# Patient Record
Sex: FEMALE | Race: WHITE | NOT HISPANIC OR LATINO
[De-identification: names, ages, dates, MRNs, and addresses within clinical notes are randomized per-mention and may not be internally consistent; named-entity substitution may affect disease eponyms.]

---

## 2021-07-13 ENCOUNTER — APPOINTMENT (OUTPATIENT)
Dept: PEDIATRIC ORTHOPEDIC SURGERY | Facility: CLINIC | Age: 76
End: 2021-07-13
Payer: MEDICARE

## 2021-07-13 VITALS — HEIGHT: 67 IN | BODY MASS INDEX: 28.25 KG/M2 | WEIGHT: 180 LBS

## 2021-07-13 DIAGNOSIS — Z80.9 FAMILY HISTORY OF MALIGNANT NEOPLASM, UNSPECIFIED: ICD-10-CM

## 2021-07-13 DIAGNOSIS — Z82.61 FAMILY HISTORY OF ARTHRITIS: ICD-10-CM

## 2021-07-13 DIAGNOSIS — Z83.3 FAMILY HISTORY OF DIABETES MELLITUS: ICD-10-CM

## 2021-07-13 DIAGNOSIS — Z86.79 PERSONAL HISTORY OF OTHER DISEASES OF THE CIRCULATORY SYSTEM: ICD-10-CM

## 2021-07-13 DIAGNOSIS — Z78.9 OTHER SPECIFIED HEALTH STATUS: ICD-10-CM

## 2021-07-13 DIAGNOSIS — Z87.448 PERSONAL HISTORY OF OTHER DISEASES OF URINARY SYSTEM: ICD-10-CM

## 2021-07-13 DIAGNOSIS — Z85.9 PERSONAL HISTORY OF MALIGNANT NEOPLASM, UNSPECIFIED: ICD-10-CM

## 2021-07-13 DIAGNOSIS — Z72.89 OTHER PROBLEMS RELATED TO LIFESTYLE: ICD-10-CM

## 2021-07-13 DIAGNOSIS — Z83.49 FAMILY HISTORY OF OTHER ENDOCRINE, NUTRITIONAL AND METABOLIC DISEASES: ICD-10-CM

## 2021-07-13 DIAGNOSIS — Z82.49 FAMILY HISTORY OF ISCHEMIC HEART DISEASE AND OTHER DISEASES OF THE CIRCULATORY SYSTEM: ICD-10-CM

## 2021-07-13 DIAGNOSIS — Z87.39 PERSONAL HISTORY OF OTHER DISEASES OF THE MUSCULOSKELETAL SYSTEM AND CONNECTIVE TISSUE: ICD-10-CM

## 2021-07-13 PROCEDURE — 99213 OFFICE O/P EST LOW 20 MIN: CPT | Mod: 25

## 2021-07-13 PROCEDURE — 73502 X-RAY EXAM HIP UNI 2-3 VIEWS: CPT

## 2021-07-13 PROCEDURE — 20610 DRAIN/INJ JOINT/BURSA W/O US: CPT | Mod: RT

## 2021-07-13 RX ORDER — PAROXETINE HYDROCHLORIDE 20 MG/1
20 TABLET, FILM COATED ORAL
Refills: 0 | Status: ACTIVE | COMMUNITY

## 2021-07-13 RX ORDER — PNV NO.95/FERROUS FUM/FOLIC AC 28MG-0.8MG
TABLET ORAL
Refills: 0 | Status: ACTIVE | COMMUNITY

## 2021-07-13 RX ORDER — FENOFIBRATE 48 MG/1
48 TABLET ORAL
Refills: 0 | Status: ACTIVE | COMMUNITY

## 2021-07-13 RX ORDER — CYANOCOBALAMIN (VITAMIN B-12) 1000 MCG
TABLET ORAL
Refills: 0 | Status: ACTIVE | COMMUNITY

## 2021-07-13 RX ORDER — MIRABEGRON 25 MG/1
25 TABLET, FILM COATED, EXTENDED RELEASE ORAL
Refills: 0 | Status: ACTIVE | COMMUNITY

## 2021-07-13 RX ORDER — APIXABAN 5 MG/1
5 TABLET, FILM COATED ORAL
Refills: 0 | Status: ACTIVE | COMMUNITY

## 2021-07-13 RX ORDER — EZETIMIBE 10 MG/1
10 TABLET ORAL
Refills: 0 | Status: ACTIVE | COMMUNITY

## 2021-07-13 RX ORDER — ATENOLOL 100 MG/1
100 TABLET ORAL
Refills: 0 | Status: ACTIVE | COMMUNITY

## 2021-07-13 RX ORDER — NORTRIPTYLINE HYDROCHLORIDE 75 MG/1
CAPSULE ORAL
Refills: 0 | Status: ACTIVE | COMMUNITY

## 2021-07-13 RX ORDER — ATORVASTATIN CALCIUM 40 MG/1
40 TABLET, FILM COATED ORAL
Refills: 0 | Status: ACTIVE | COMMUNITY

## 2021-07-13 RX ORDER — CARBAMAZEPINE 300 MG/1
300 CAPSULE, EXTENDED RELEASE ORAL
Refills: 0 | Status: ACTIVE | COMMUNITY

## 2021-07-13 RX ORDER — ROPINIROLE 0.25 MG/1
0.25 TABLET, FILM COATED ORAL
Refills: 0 | Status: ACTIVE | COMMUNITY

## 2021-07-13 RX ORDER — MULTIVITAMIN
TABLET ORAL
Refills: 0 | Status: ACTIVE | COMMUNITY

## 2021-07-13 NOTE — ASSESSMENT
[FreeTextEntry1] : Impression: Right trochanteric bursitis mild DJD of the right hip.\par \par I have injected the trochanteric bursa sterilely and uneventfully with 40 mg of Depo-Medrol and 7 cc of 1% lidocaine. She is not interested in physical therapy at this time she'll return as necessary.

## 2021-07-13 NOTE — PHYSICAL EXAM
[de-identified] : On exam today she has a mild antalgic gait on the right side. She has good motion to the lumbar spine in all planes with no significant spasm or tenderness present on today's visit. The right hip has mild restriction of abduction and internal rotation as compared to the opposite side. No tenderness over the anterior hip capsule she is tender about the trochanter and gluteal musculature distally. Straight leg raising is negative she is neurologically intact.\par \par X-rays taken today 2 views of the right hip reveal mild degenerative change no lesion

## 2021-07-13 NOTE — PROCEDURE
[Injection] : Injection [Right] : of the right [Greater Trochanter] : greater trochanteric bursa [Inflammation] : Inflammation [Patient] : patient [Risk] : risk [Benefits] : benefits [Alternatives] : alternatives [Infection] : infection [Allergic Reaction] : allergic reaction [Verbal Consent Obtained] : verbal consent was obtained prior to the procedure [Betadine] : Betadine [Lateral] : lateral [20] : a 20-gauge [1% Lidocaine___(mL)] : [unfilled] mL of 1% Lidocaine [Methylpred. 40mg/mL___(mL)] : [unfilled] mL 40mg/mL methylprednisolone [Bandage Applied] : a bandage [Tolerated Well] : The patient tolerated the procedure well [None] : none [No Strenuous Activity___day(s)] : avoid strenuous activity for [unfilled] day(s) [PRN] : as needed

## 2021-07-13 NOTE — HISTORY OF PRESENT ILLNESS
[de-identified] : This patient returns she is having pain in the right hip region with no injury this over the past 3-4 weeks. She does not have any significant back pain or pain does not radiate distally. She is ambulating with a mild limp on/off.

## 2022-07-18 ENCOUNTER — APPOINTMENT (OUTPATIENT)
Dept: PEDIATRIC ORTHOPEDIC SURGERY | Facility: CLINIC | Age: 77
End: 2022-07-18

## 2022-07-18 PROCEDURE — 99212 OFFICE O/P EST SF 10 MIN: CPT

## 2022-07-18 NOTE — ASSESSMENT
[FreeTextEntry1] : Impression: Mild symptomatic arthritis left hip with trochanteric bursitis.\par \par She will continue symptomatically and will return as necessary

## 2022-07-18 NOTE — HISTORY OF PRESENT ILLNESS
[de-identified] : This 76-year-old returns for follow-up of her left hip pain.  She does reasonably well there are days when she does not have much in the way of any discomfort and does not limp.  She has no significant complaints of back pain and her pain does not radiate distally.  She on today's visit is actually feeling quite good.  This to be noted she did have a recent bone density study which revealed osteopenia.  She also is being worked up for elevated parathyroid hormone.  She is scheduled to complete a 24-hour urinary collection for this.

## 2022-07-18 NOTE — PHYSICAL EXAM
[de-identified] : On exam today she is walking without any significant limp she has good motion to the lumbar spine with minimal spasm if any good motion to the left hip mildly restricted in abduction internal rotation with minimal discomfort if any over the trochanteric bursa.  The remainder the exam is benign

## 2022-08-08 ENCOUNTER — APPOINTMENT (OUTPATIENT)
Dept: PEDIATRIC ORTHOPEDIC SURGERY | Facility: CLINIC | Age: 77
End: 2022-08-08

## 2022-08-08 VITALS — WEIGHT: 201 LBS | TEMPERATURE: 96.7 F | HEIGHT: 67 IN | BODY MASS INDEX: 31.55 KG/M2

## 2022-08-08 DIAGNOSIS — M16.11 UNILATERAL PRIMARY OSTEOARTHRITIS, RIGHT HIP: ICD-10-CM

## 2022-08-08 PROCEDURE — 99213 OFFICE O/P EST LOW 20 MIN: CPT | Mod: 25

## 2022-08-08 PROCEDURE — 20610 DRAIN/INJ JOINT/BURSA W/O US: CPT | Mod: RT

## 2022-08-08 NOTE — PROCEDURE
[FreeTextEntry1] : Under sterile technique with a Betadine prep the left trochanteric bursa was injected with a 21-gauge needle with 80 mg of methylprednisolone and 8 cc of 1% lidocaine with a Band-Aid dressing applied at the conclusion.  This was tolerated without incident

## 2022-08-08 NOTE — ASSESSMENT
[FreeTextEntry1] : Impression: Trochanteric bursitis left hip, degenerative arthritis left hip.  The bursa has been injected she will continue with Tylenol she is not interested in physical therapy at this time she will return as necessary

## 2022-08-08 NOTE — HISTORY OF PRESENT ILLNESS
[de-identified] : This 76-year-old returns with continued pain about the lateral aspect of the left hip.

## 2022-08-08 NOTE — PHYSICAL EXAM
[de-identified] : Her exam today she has a very slight limp on the left side she maintains reasonable motion to the left hip there is obvious tenderness over the trochanteric bursa

## 2022-08-29 DIAGNOSIS — Z00.00 ENCOUNTER FOR GENERAL ADULT MEDICAL EXAMINATION W/OUT ABNORMAL FINDINGS: ICD-10-CM

## 2022-10-13 ENCOUNTER — APPOINTMENT (OUTPATIENT)
Dept: PEDIATRIC ORTHOPEDIC SURGERY | Facility: CLINIC | Age: 77
End: 2022-10-13

## 2022-10-13 VITALS — HEIGHT: 67 IN | BODY MASS INDEX: 31.55 KG/M2 | WEIGHT: 201 LBS

## 2022-10-13 DIAGNOSIS — M70.61 TROCHANTERIC BURSITIS, RIGHT HIP: ICD-10-CM

## 2022-10-13 PROCEDURE — 72170 X-RAY EXAM OF PELVIS: CPT

## 2022-10-13 PROCEDURE — 99213 OFFICE O/P EST LOW 20 MIN: CPT

## 2022-10-13 NOTE — ASSESSMENT
[FreeTextEntry1] : Impression: Bilateral trochanteric bursitis myalgias lumbar spine.\par \par This patient will continue with therapeutic exercises and weight reduction return as necessary

## 2022-10-13 NOTE — HISTORY OF PRESENT ILLNESS
[de-identified] : This 77-year-old returns she is having pain along the lateral aspect of both hips.  She did start physical therapy and this she felt increased her pain initially.  She also has some mild chronic back complaints on no significant radiation distally

## 2022-10-13 NOTE — PHYSICAL EXAM
[de-identified] : Her exam reveals a mild antalgic gait on both sides.  She does have restricted motion to the back especially in flexion and extension.  There is spasm on both sides though felt to be mild with no obvious trigger points present the posterior pelvic wall is nontender.  She has reasonable motion to both hips though she is tender about the trochanteric bursa on both sides.\par \par X-rays ordered and taken today of the pelvis reveals arthritic changes to the hip joints though no evidence of lesion

## 2022-11-03 ENCOUNTER — OFFICE (OUTPATIENT)
Dept: URBAN - METROPOLITAN AREA CLINIC 29 | Facility: CLINIC | Age: 77
Setting detail: OPHTHALMOLOGY
End: 2022-11-03
Payer: MEDICARE

## 2022-11-03 DIAGNOSIS — H40.003: ICD-10-CM

## 2022-11-03 DIAGNOSIS — E13.9: ICD-10-CM

## 2022-11-03 DIAGNOSIS — H16.223: ICD-10-CM

## 2022-11-03 DIAGNOSIS — H25.093: ICD-10-CM

## 2022-11-03 DIAGNOSIS — E11.9: ICD-10-CM

## 2022-11-03 PROCEDURE — 92014 COMPRE OPH EXAM EST PT 1/>: CPT | Performed by: OPHTHALMOLOGY

## 2022-11-03 ASSESSMENT — SPHEQUIV_DERIVED
OD_SPHEQUIV: -0.75
OD_SPHEQUIV: -0.75
OS_SPHEQUIV: -0.5
OS_SPHEQUIV: -0.5

## 2022-11-03 ASSESSMENT — REFRACTION_CURRENTRX
OD_SPHERE: -1.75
OS_OVR_VA: 20/
OS_OVR_VA: 20/
OD_VPRISM_DIRECTION: BF
OD_OVR_VA: 20/
OS_CYLINDER: +0.50
OS_SPHERE: -1.25
OD_OVR_VA: 20/
OS_VPRISM_DIRECTION: BF
OS_CYLINDER: +0.50
OD_AXIS: 165
OD_ADD: +2.75
OD_ADD: +2.50
OD_CYLINDER: +1.50
OD_AXIS: 160
OS_AXIS: 5
OS_SPHERE: -1.00
OS_ADD: +2.50
OS_AXIS: 35
OD_CYLINDER: +0.50
OD_SPHERE: -2.00
OS_VPRISM_DIRECTION: BF
OD_VPRISM_DIRECTION: BF
OS_ADD: +2.75

## 2022-11-03 ASSESSMENT — AXIALLENGTH_DERIVED
OD_AL: 23.336
OS_AL: 23.0642
OS_AL: 23.0642
OD_AL: 23.336

## 2022-11-03 ASSESSMENT — REFRACTION_MANIFEST
OS_SPHERE: -0.75
OS_AXIS: 25
OD_CYLINDER: +1.00
OS_CYLINDER: +0.50
OD_SPHERE: -1.25
OD_AXIS: 165
OD_ADD: +2.50
OD_VA1: 20/25
OS_ADD: +2.50
OS_VA1: 20/25-1

## 2022-11-03 ASSESSMENT — KERATOMETRY
OD_AXISANGLE_DEGREES: 98
OS_AXISANGLE_DEGREES: 38
OD_K1POWER_DIOPTERS: 45.25
OD_K2POWER_DIOPTERS: 44.75
OS_K1POWER_DIOPTERS: 45.50
OS_K2POWER_DIOPTERS: 45.50

## 2022-11-03 ASSESSMENT — REFRACTION_AUTOREFRACTION
OD_CYLINDER: +1.00
OD_AXIS: 165
OS_CYLINDER: +0.50
OS_AXIS: 25
OS_SPHERE: -0.75
OD_SPHERE: -1.25

## 2022-11-03 ASSESSMENT — TONOMETRY
OS_IOP_MMHG: 16
OD_IOP_MMHG: 18

## 2022-11-03 ASSESSMENT — VISUAL ACUITY
OD_BCVA: 20/30-1
OS_BCVA: 20/25-1

## 2022-11-03 ASSESSMENT — CONFRONTATIONAL VISUAL FIELD TEST (CVF)
OD_FINDINGS: FULL
OS_FINDINGS: FULL

## 2022-11-03 ASSESSMENT — LID EXAM ASSESSMENTS
OS_COMMENTS: BLEPHARITIS CHANGES OF THE EYELID MARGINS
OD_COMMENTS: BLEPHARITIS CHANGES OF THE EYELID MARGINS

## 2023-07-03 ENCOUNTER — APPOINTMENT (OUTPATIENT)
Dept: PEDIATRIC ORTHOPEDIC SURGERY | Facility: CLINIC | Age: 78
End: 2023-07-03
Payer: MEDICARE

## 2023-07-03 VITALS
SYSTOLIC BLOOD PRESSURE: 148 MMHG | HEIGHT: 66 IN | BODY MASS INDEX: 32.47 KG/M2 | WEIGHT: 202 LBS | DIASTOLIC BLOOD PRESSURE: 84 MMHG | TEMPERATURE: 96.6 F

## 2023-07-03 PROCEDURE — 99212 OFFICE O/P EST SF 10 MIN: CPT

## 2023-07-03 NOTE — PHYSICAL EXAM
[de-identified] : Her exam reveals she is walking well no significant limp she has good motion to the lumbar spine as well as the left hip no significant points of tenderness/spasm of concern.  Her exam is benign

## 2023-07-03 NOTE — HISTORY OF PRESENT ILLNESS
[de-identified] : This patient returns today she was in [place] recently she was having some discomfort in the left buttock and hip region causing mild limp.  However she has had significant improvement over the last 24 hours and is actually doing well.  She comes in more so out of concern.

## 2023-07-17 ENCOUNTER — APPOINTMENT (OUTPATIENT)
Dept: PEDIATRIC ORTHOPEDIC SURGERY | Facility: CLINIC | Age: 78
End: 2023-07-17
Payer: MEDICARE

## 2023-07-17 VITALS
BODY MASS INDEX: 32.47 KG/M2 | TEMPERATURE: 96.8 F | SYSTOLIC BLOOD PRESSURE: 129 MMHG | DIASTOLIC BLOOD PRESSURE: 82 MMHG | HEIGHT: 66 IN | WEIGHT: 202 LBS

## 2023-07-17 PROCEDURE — 20610 DRAIN/INJ JOINT/BURSA W/O US: CPT | Mod: LT

## 2023-07-17 NOTE — PROCEDURE
[FreeTextEntry1] : Under sterile technique with a alcohol prep the bursa just distal to the left greater trochanter was injected with 40 mg of methylprednisolone and 4 cc of 1% lidocaine with a Band-Aid dressing applied at the conclusion this was tolerated without incident

## 2023-07-17 NOTE — PHYSICAL EXAM
[de-identified] : Her exam reveals she is walking with a slight antalgic component and limp on the left side.  She has good motion to the hip mild discomfort just distal to the greater trochanter on the left side.  She has a normal neurologic exam

## 2023-07-17 NOTE — HISTORY OF PRESENT ILLNESS
[de-identified] : This patient returns she is requesting injection along the lateral aspect of her left hip.

## 2023-08-07 ENCOUNTER — APPOINTMENT (OUTPATIENT)
Dept: PEDIATRIC ORTHOPEDIC SURGERY | Facility: CLINIC | Age: 78
End: 2023-08-07
Payer: MEDICARE

## 2023-08-07 VITALS
HEIGHT: 66 IN | WEIGHT: 202 LBS | SYSTOLIC BLOOD PRESSURE: 148 MMHG | TEMPERATURE: 96.6 F | DIASTOLIC BLOOD PRESSURE: 63 MMHG | BODY MASS INDEX: 32.47 KG/M2

## 2023-08-07 DIAGNOSIS — M54.16 RADICULOPATHY, LUMBAR REGION: ICD-10-CM

## 2023-08-07 DIAGNOSIS — M79.18 MYALGIA, OTHER SITE: ICD-10-CM

## 2023-08-07 PROCEDURE — 73502 X-RAY EXAM HIP UNI 2-3 VIEWS: CPT

## 2023-08-07 PROCEDURE — 20552 NJX 1/MLT TRIGGER POINT 1/2: CPT

## 2023-08-07 PROCEDURE — 99213 OFFICE O/P EST LOW 20 MIN: CPT | Mod: 25

## 2023-08-07 PROCEDURE — 72100 X-RAY EXAM L-S SPINE 2/3 VWS: CPT

## 2023-08-07 NOTE — PROCEDURE
[FreeTextEntry1] : Under sterile technique with an alcohol prep the trigger point in the lumbar segment was injected with 40 mg of methylprednisolone and 4 cc of 1% lidocaine with a Band-Aid dressing applied at the conclusion this was tolerated without incident

## 2023-08-07 NOTE — ASSESSMENT
[FreeTextEntry1] : Impression: Lumbar myalgias/radiculitis, degenerative change left hip with trochanteric bursitis.  I have injected the trigger point in the back.  I have encouraged her to follow through with physical therapy for which provided a prescription.  The possibility of imaging study has been discussed.

## 2023-08-07 NOTE — PHYSICAL EXAM
[de-identified] : Exam today reveals she is ambulating with an antalgic gait.  She has painful motion to the lumbar spine with spasm and tenderness more so on the left side of the lumbar segment there is a trigger point in the left SI joint/sciatic notch region.  The left hip there is pain on motion especially in abduction and rotation.  Mild tenderness continues over the trochanter however mild discomfort over the anterior hip capsule is noted as well.  Straight leg raising is uncomfortable negative she is neurologically stable.  X-rays ordered and taken today of the lumbar spine and the left hip revealed mild degenerative changes without lesions of the hip.  The lumbar study reveals to space narrowing from L4-to the sacrum.  There is also a grade 1 degenerative spondylolisthesis at L4-5 no lesion none

## 2023-08-07 NOTE — HISTORY OF PRESENT ILLNESS
[de-identified] : This elderly patient returns she is having increasing low back pain more left-sided that radiates into the left buttock hip and thigh.  She also using a cane because she states her left hip is "acting up".  She has some occasional numbness into her toes on both sides.  Otherwise no significant changes with regards to her motor function.

## 2023-09-06 ENCOUNTER — APPOINTMENT (OUTPATIENT)
Dept: PEDIATRIC ORTHOPEDIC SURGERY | Facility: CLINIC | Age: 78
End: 2023-09-06
Payer: MEDICARE

## 2023-09-06 VITALS
WEIGHT: 200 LBS | HEIGHT: 66 IN | BODY MASS INDEX: 32.14 KG/M2 | SYSTOLIC BLOOD PRESSURE: 164 MMHG | TEMPERATURE: 96.8 F | DIASTOLIC BLOOD PRESSURE: 91 MMHG

## 2023-09-06 PROCEDURE — 20610 DRAIN/INJ JOINT/BURSA W/O US: CPT | Mod: LT

## 2023-09-06 PROCEDURE — 99212 OFFICE O/P EST SF 10 MIN: CPT | Mod: 25

## 2023-09-06 NOTE — PROCEDURE
[de-identified] : Under sterile technique with a Betadine prep the left trochanteric bursa was injected with 40 mg of methylprednisolone and 5 cc of 1% lidocaine with a Band-Aid dressing applied at the conclusion this was tolerated without incident

## 2023-09-06 NOTE — HISTORY OF PRESENT ILLNESS
[de-identified] : This 77-year-old returns she returns with pain again in her left lateral hip region no injury she is using a cane.  Her back does not bother her

## 2023-09-06 NOTE — ASSESSMENT
[FreeTextEntry1] : Impression: Recurrent bursitis left hip.  The bursa has been injected she will continue with warm soaks/ice as tolerated along with Tylenol and will return on a as needed basis

## 2023-09-06 NOTE — PHYSICAL EXAM
[de-identified] : Her exam today reveals a mild limp on the left side she maintains good motion to the left hip she does have recurrent bursitis over the trochanter noted on palpation there is no obvious swelling erythema or increased warmth.

## 2023-09-11 ENCOUNTER — APPOINTMENT (OUTPATIENT)
Dept: PEDIATRIC ORTHOPEDIC SURGERY | Facility: CLINIC | Age: 78
End: 2023-09-11
Payer: MEDICARE

## 2023-09-11 VITALS
SYSTOLIC BLOOD PRESSURE: 160 MMHG | HEIGHT: 66 IN | TEMPERATURE: 97 F | DIASTOLIC BLOOD PRESSURE: 85 MMHG | BODY MASS INDEX: 32.14 KG/M2 | WEIGHT: 200 LBS

## 2023-09-11 DIAGNOSIS — M16.12 UNILATERAL PRIMARY OSTEOARTHRITIS, LEFT HIP: ICD-10-CM

## 2023-09-11 PROCEDURE — 73502 X-RAY EXAM HIP UNI 2-3 VIEWS: CPT

## 2023-09-11 PROCEDURE — 99212 OFFICE O/P EST SF 10 MIN: CPT

## 2023-11-08 ENCOUNTER — RX ONLY (RX ONLY)
Age: 78
End: 2023-11-08

## 2023-11-08 ENCOUNTER — OFFICE (OUTPATIENT)
Dept: URBAN - METROPOLITAN AREA CLINIC 29 | Facility: CLINIC | Age: 78
Setting detail: OPHTHALMOLOGY
End: 2023-11-08
Payer: MEDICARE

## 2023-11-08 DIAGNOSIS — H25.093: ICD-10-CM

## 2023-11-08 DIAGNOSIS — H16.223: ICD-10-CM

## 2023-11-08 DIAGNOSIS — H52.13: ICD-10-CM

## 2023-11-08 DIAGNOSIS — H40.003: ICD-10-CM

## 2023-11-08 DIAGNOSIS — E11.9: ICD-10-CM

## 2023-11-08 PROCEDURE — 92015 DETERMINE REFRACTIVE STATE: CPT | Performed by: OPHTHALMOLOGY

## 2023-11-08 PROCEDURE — 92014 COMPRE OPH EXAM EST PT 1/>: CPT | Performed by: OPHTHALMOLOGY

## 2023-11-08 ASSESSMENT — REFRACTION_CURRENTRX
OD_SPHERE: -1.75
OS_VPRISM_DIRECTION: BF
OD_OVR_VA: 20/
OS_OVR_VA: 20/
OS_AXIS: 5
OD_OVR_VA: 20/
OD_AXIS: 165
OS_ADD: +2.50
OS_SPHERE: -1.00
OS_VPRISM_DIRECTION: BF
OD_VPRISM_DIRECTION: BF
OD_SPHERE: -2.00
OS_OVR_VA: 20/
OD_ADD: +2.75
OS_CYLINDER: +0.50
OS_SPHERE: -1.25
OD_AXIS: 160
OD_CYLINDER: +1.50
OD_VPRISM_DIRECTION: BF
OS_CYLINDER: +0.50
OS_AXIS: 35
OS_ADD: +2.75
OD_ADD: +2.50
OD_CYLINDER: +0.50

## 2023-11-08 ASSESSMENT — REFRACTION_MANIFEST
OS_CYLINDER: +1.00
OS_ADD: +2.50
OD_ADD: +2.50
OD_SPHERE: -1.50
OS_VA1: 20/25
OD_CYLINDER: +1.50
OD_VA1: 20/25
OD_AXIS: 170
OS_AXIS: 25
OS_SPHERE: -1.00

## 2023-11-08 ASSESSMENT — LID EXAM ASSESSMENTS
OD_COMMENTS: BLEPHARITIS CHANGES OF THE EYELID MARGINS
OS_COMMENTS: BLEPHARITIS CHANGES OF THE EYELID MARGINS

## 2023-11-08 ASSESSMENT — REFRACTION_AUTOREFRACTION
OD_SPHERE: -1.50
OD_CYLINDER: +2.00
OS_AXIS: 030
OS_SPHERE: -1.00
OS_CYLINDER: +1.25
OD_AXIS: 170

## 2023-11-08 ASSESSMENT — SPHEQUIV_DERIVED
OS_SPHEQUIV: -0.375
OD_SPHEQUIV: -0.75
OD_SPHEQUIV: -0.5
OS_SPHEQUIV: -0.5

## 2023-11-08 ASSESSMENT — CONFRONTATIONAL VISUAL FIELD TEST (CVF)
OD_FINDINGS: FULL
OS_FINDINGS: FULL

## 2024-04-09 ENCOUNTER — APPOINTMENT (OUTPATIENT)
Dept: PEDIATRIC ORTHOPEDIC SURGERY | Facility: CLINIC | Age: 79
End: 2024-04-09
Payer: MEDICARE

## 2024-04-09 VITALS
DIASTOLIC BLOOD PRESSURE: 70 MMHG | HEIGHT: 65 IN | WEIGHT: 201 LBS | BODY MASS INDEX: 33.49 KG/M2 | TEMPERATURE: 96.8 F | SYSTOLIC BLOOD PRESSURE: 160 MMHG

## 2024-04-09 DIAGNOSIS — M70.62 TROCHANTERIC BURSITIS, LEFT HIP: ICD-10-CM

## 2024-04-09 PROCEDURE — 99213 OFFICE O/P EST LOW 20 MIN: CPT | Mod: 25

## 2024-04-09 PROCEDURE — 20610 DRAIN/INJ JOINT/BURSA W/O US: CPT | Mod: LT

## 2024-04-09 NOTE — ASSESSMENT
[FreeTextEntry1] : Impression: Bursitis left hip.  She has been injected she will continue with Tylenol and her walker return as necessary

## 2024-04-09 NOTE — PHYSICAL EXAM
[de-identified] : Exam today reveals she is walking nicely with a walker for support she has good motion to the left.  Mildly restricted she has pain over the trochanteric bursa neurovascular status is intact

## 2024-04-09 NOTE — PROCEDURE
[FreeTextEntry1] : Under sterile technique with a Betadine prep the left trochanteric bursa was injected with 6 cc of 1% lidocaine and 40 mg of methylprednisolone with a Band-Aid dressing applied at the conclusion this was tolerated without incident

## 2024-04-09 NOTE — HISTORY OF PRESENT ILLNESS
[de-identified] : This 78-year-old returns she has been in pain management.  She comes in today her back is improved she is having recurrent discomfort along the lateral aspect of the left hip no injury.  General health has been good and stable since last seen

## 2024-11-20 ENCOUNTER — OFFICE (OUTPATIENT)
Dept: URBAN - METROPOLITAN AREA CLINIC 29 | Facility: CLINIC | Age: 79
Setting detail: OPHTHALMOLOGY
End: 2024-11-20
Payer: MEDICARE

## 2024-11-20 DIAGNOSIS — H25.13: ICD-10-CM

## 2024-11-20 DIAGNOSIS — H16.223: ICD-10-CM

## 2024-11-20 DIAGNOSIS — E11.9: ICD-10-CM

## 2024-11-20 DIAGNOSIS — H40.003: ICD-10-CM

## 2024-11-20 DIAGNOSIS — H52.4: ICD-10-CM

## 2024-11-20 PROCEDURE — 92083 EXTENDED VISUAL FIELD XM: CPT | Performed by: OPHTHALMOLOGY

## 2024-11-20 PROCEDURE — 92014 COMPRE OPH EXAM EST PT 1/>: CPT | Performed by: OPHTHALMOLOGY

## 2024-11-20 PROCEDURE — 92015 DETERMINE REFRACTIVE STATE: CPT | Performed by: OPHTHALMOLOGY

## 2024-11-20 ASSESSMENT — TONOMETRY
OS_IOP_MMHG: 13
OD_IOP_MMHG: 13

## 2024-11-20 ASSESSMENT — REFRACTION_MANIFEST
OS_CYLINDER: +1.25
OD_SPHERE: -1.50
OS_ADD: +2.50
OS_VA1: 20/25
OD_ADD: +2.50
OD_VA1: 20/25
OD_AXIS: 180
OS_SPHERE: -1.00
OS_AXIS: 25
OD_CYLINDER: +1.50

## 2024-11-20 ASSESSMENT — KERATOMETRY
OS_K1POWER_DIOPTERS: 44.50
OS_AXISANGLE_DEGREES: 22
OD_AXISANGLE_DEGREES: 161
OS_K2POWER_DIOPTERS: 45.00
OD_K1POWER_DIOPTERS: 44.50
OD_K2POWER_DIOPTERS: 45.00

## 2024-11-20 ASSESSMENT — REFRACTION_AUTOREFRACTION
OD_SPHERE: -1.25
OD_CYLINDER: +1.25
OS_AXIS: 018
OD_AXIS: 167
OS_CYLINDER: +1.25
OS_SPHERE: -0.75

## 2024-11-20 ASSESSMENT — VISUAL ACUITY
OD_BCVA: 20/30-2
OS_BCVA: 20/30-2

## 2024-11-20 ASSESSMENT — REFRACTION_CURRENTRX
OS_OVR_VA: 20/
OD_OVR_VA: 20/
OD_OVR_VA: 20/
OS_OVR_VA: 20/

## 2024-11-20 ASSESSMENT — CONFRONTATIONAL VISUAL FIELD TEST (CVF)
OD_FINDINGS: FULL
OS_FINDINGS: FULL